# Patient Record
Sex: FEMALE | Race: WHITE | Employment: OTHER | URBAN - METROPOLITAN AREA
[De-identification: names, ages, dates, MRNs, and addresses within clinical notes are randomized per-mention and may not be internally consistent; named-entity substitution may affect disease eponyms.]

---

## 2021-05-31 ENCOUNTER — RECORDS - HEALTHEAST (OUTPATIENT)
Dept: ADMINISTRATIVE | Facility: CLINIC | Age: 66
End: 2021-05-31

## 2024-09-10 ENCOUNTER — DOCUMENTATION ONLY (OUTPATIENT)
Dept: GERIATRICS | Facility: CLINIC | Age: 69
End: 2024-09-10
Payer: COMMERCIAL

## 2024-09-12 ENCOUNTER — DOCUMENTATION ONLY (OUTPATIENT)
Dept: GERIATRICS | Facility: CLINIC | Age: 69
End: 2024-09-12

## 2024-09-12 ENCOUNTER — TRANSITIONAL CARE UNIT VISIT (OUTPATIENT)
Dept: GERIATRICS | Facility: CLINIC | Age: 69
End: 2024-09-12
Payer: COMMERCIAL

## 2024-09-12 VITALS
HEIGHT: 63 IN | TEMPERATURE: 98.2 F | HEART RATE: 68 BPM | BODY MASS INDEX: 18.82 KG/M2 | WEIGHT: 106.2 LBS | SYSTOLIC BLOOD PRESSURE: 123 MMHG | DIASTOLIC BLOOD PRESSURE: 70 MMHG

## 2024-09-12 DIAGNOSIS — R41.89 COGNITIVE IMPAIRMENT: ICD-10-CM

## 2024-09-12 DIAGNOSIS — E11.9 CONTROLLED TYPE 2 DIABETES MELLITUS WITHOUT COMPLICATION, WITHOUT LONG-TERM CURRENT USE OF INSULIN (H): ICD-10-CM

## 2024-09-12 DIAGNOSIS — R52 PAIN: Primary | ICD-10-CM

## 2024-09-12 DIAGNOSIS — I50.9 CHRONIC CONGESTIVE HEART FAILURE, UNSPECIFIED HEART FAILURE TYPE (H): ICD-10-CM
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
## 2024-09-12 PROBLEM — I34.1 MYXOMATOUS MITRAL VALVE: Status: ACTIVE | Noted: 2024-06-24

## 2024-09-12 PROBLEM — E87.5 HYPERKALEMIA: Status: ACTIVE | Noted: 2024-07-28

## 2024-09-12 PROBLEM — F15.10 METHAMPHETAMINE USE (H): Status: ACTIVE | Noted: 2024-07-24

## 2024-09-12 PROBLEM — E53.8 FOLIC ACID DEFICIENCY: Status: ACTIVE | Noted: 2019-07-24

## 2024-09-12 PROBLEM — J44.9 COPD (CHRONIC OBSTRUCTIVE PULMONARY DISEASE) (H): Status: ACTIVE | Noted: 2022-12-28

## 2024-09-12 PROBLEM — J43.9 PULMONARY EMPHYSEMA, UNSPECIFIED EMPHYSEMA TYPE (H): Status: ACTIVE | Noted: 2022-05-10

## 2024-09-12 PROBLEM — D64.9 ACUTE ON CHRONIC ANEMIA: Status: ACTIVE | Noted: 2024-08-07

## 2024-09-12 PROBLEM — F15.11 HISTORY OF METHAMPHETAMINE ABUSE (H): Status: ACTIVE | Noted: 2024-06-16

## 2024-09-12 PROBLEM — M85.80 OSTEOPENIA: Status: ACTIVE | Noted: 2018-11-08

## 2024-09-12 PROBLEM — D50.9 MICROCYTIC ANEMIA: Status: ACTIVE | Noted: 2024-06-15

## 2024-09-12 PROBLEM — F33.9 DEPRESSION, RECURRENT (H): Status: ACTIVE | Noted: 2024-06-16

## 2024-09-12 PROBLEM — I16.1 HYPERTENSIVE EMERGENCY: Status: ACTIVE | Noted: 2024-06-15

## 2024-09-12 PROBLEM — E11.22 TYPE 2 DIABETES MELLITUS WITH CHRONIC KIDNEY DISEASE (H): Status: ACTIVE | Noted: 2024-06-16

## 2024-09-12 PROBLEM — J96.21 ACUTE ON CHRONIC HYPOXIC RESPIRATORY FAILURE (H): Status: ACTIVE | Noted: 2024-08-17

## 2024-09-12 PROBLEM — D72.829 LEUKOCYTOSIS: Status: ACTIVE | Noted: 2018-11-09

## 2024-09-12 PROBLEM — E87.1 HYPONATREMIA: Status: ACTIVE | Noted: 2023-12-14

## 2024-09-12 PROBLEM — D62 ACUTE BLOOD LOSS ANEMIA: Status: ACTIVE | Noted: 2022-05-04

## 2024-09-12 PROBLEM — I34.0 SEVERE MITRAL VALVE REGURGITATION: Status: ACTIVE | Noted: 2023-08-27

## 2024-09-12 PROBLEM — R93.89 ABNORMAL CHEST X-RAY: Status: ACTIVE | Noted: 2024-08-19

## 2024-09-12 PROBLEM — J96.02 ACUTE RESPIRATORY FAILURE WITH HYPOXIA AND HYPERCAPNIA (H): Status: ACTIVE | Noted: 2024-07-28

## 2024-09-12 PROBLEM — I50.23 ACUTE ON CHRONIC HFREF (HEART FAILURE WITH REDUCED EJECTION FRACTION) (H): Status: ACTIVE | Noted: 2023-08-27

## 2024-09-12 PROBLEM — I50.33 ACUTE ON CHRONIC HEART FAILURE WITH PRESERVED EJECTION FRACTION (HFPEF) (H): Status: ACTIVE | Noted: 2024-08-17
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
## 2024-09-12 PROBLEM — K75.81 NASH (NONALCOHOLIC STEATOHEPATITIS): Status: ACTIVE | Noted: 2018-03-12

## 2024-09-12 PROBLEM — J44.1 COPD WITH EXACERBATION (H): Status: ACTIVE | Noted: 2024-08-17

## 2024-09-12 PROBLEM — J96.01 ACUTE RESPIRATORY FAILURE WITH HYPOXIA AND HYPERCAPNIA (H): Status: ACTIVE | Noted: 2024-07-28

## 2024-09-12 PROBLEM — I50.21 ACUTE SYSTOLIC (CONGESTIVE) HEART FAILURE (H): Status: ACTIVE | Noted: 2022-12-29

## 2024-09-12 PROBLEM — R07.9 CHEST PAIN: Status: ACTIVE | Noted: 2024-06-15

## 2024-09-12 PROBLEM — N18.30 STAGE 3 CHRONIC KIDNEY DISEASE, UNSPECIFIED WHETHER STAGE 3A OR 3B CKD (H): Status: ACTIVE | Noted: 2024-06-16

## 2024-09-12 PROBLEM — I10 HTN (HYPERTENSION): Status: ACTIVE | Noted: 2020-10-28

## 2024-09-12 PROBLEM — I50.33 ACUTE ON CHRONIC DIASTOLIC (CONGESTIVE) HEART FAILURE (H): Status: ACTIVE | Noted: 2024-08-07

## 2024-09-12 PROCEDURE — 99309 SBSQ NF CARE MODERATE MDM 30: CPT | Performed by: NURSE PRACTITIONER

## 2024-09-12 RX ORDER — ATORVASTATIN CALCIUM 20 MG/1
20 TABLET, FILM COATED ORAL DAILY
COMMUNITY

## 2024-09-12 RX ORDER — FUROSEMIDE 40 MG
40 TABLET ORAL DAILY
COMMUNITY

## 2024-09-12 RX ORDER — OXYCODONE HYDROCHLORIDE 5 MG/1
5 TABLET ORAL EVERY 6 HOURS PRN
COMMUNITY
Start: 2024-09-12 | End: 2024-09-12

## 2024-09-12 RX ORDER — OXYCODONE HYDROCHLORIDE 5 MG/1
5 TABLET ORAL EVERY 6 HOURS PRN
COMMUNITY

## 2024-09-12 RX ORDER — OXYCODONE HYDROCHLORIDE 5 MG/1
5 TABLET ORAL EVERY 6 HOURS PRN
Qty: 60 TABLET | Refills: 0 | Status: SHIPPED | OUTPATIENT
Start: 2024-09-12

## 2024-09-12 RX ORDER — ALBUTEROL SULFATE 0.83 MG/ML
2.5 SOLUTION RESPIRATORY (INHALATION) EVERY 4 HOURS PRN
COMMUNITY

## 2024-09-12 RX ORDER — MICONAZOLE NITRATE 20 MG/G
CREAM TOPICAL 2 TIMES DAILY
COMMUNITY

## 2024-09-12 RX ORDER — FLUOXETINE 40 MG/1
40 CAPSULE ORAL DAILY
COMMUNITY

## 2024-09-12 RX ORDER — MIRTAZAPINE 7.5 MG/1
7.5 TABLET, FILM COATED ORAL AT BEDTIME
COMMUNITY

## 2024-09-12 RX ORDER — ISOSORBIDE MONONITRATE 60 MG/1
60 TABLET, EXTENDED RELEASE ORAL DAILY
COMMUNITY

## 2024-09-12 RX ORDER — CARVEDILOL 25 MG/1
25 TABLET ORAL 2 TIMES DAILY WITH MEALS
COMMUNITY

## 2024-09-12 NOTE — PROGRESS NOTES
[facility] GERIATRICS    PRIMARY CARE PROVIDER AND CLINIC:  No primary care provider on file., No primary physician on file.  Chief Complaint   Patient presents with    Hospital F/U      [place] Medical Record Number:  [id number]  Place of Service where encounter took place:  [facility] ([facility]) [[id number]]    Sloane Monroe  is a 69 year old  (1955), admitted to the above facility from  [facility]. Hospital stay 9/7/2024 through 9/11/2024..   HPI:    Brisa has a history of diabetes, CHF, hypertension, COPD and cognitive impairment.  She lives independently and was admitted to the bathroom at night when she fell and landed on her bottom.  Imaging negative for acute process in the hospital.  She was also placed on steroids for diffuse body pain concern for rheumatoid arthritis flare.  She did have an NINO and her diuretics were held however resumed at discharge.    She is seen sitting up in her bed.  She is alert, able to give me some background on her hospital stay but not detailed.  She denies acute pain today.    CODE STATUS/ADVANCE DIRECTIVES DISCUSSION:  Full Code  CPR/Full code   ALLERGIES:   Allergies   Allergen Reactions    Bupropion Hives      PAST MEDICAL HISTORY: No past medical history on file.   PAST SURGICAL HISTORY:   has no past surgical history on file.  FAMILY HISTORY: family history is not on file.  SOCIAL HISTORY:     Patient's living condition: lives alone    Post Discharge Medication Reconciliation Status:   MED REC REQUIRED  Post Medication Reconciliation Status:  Discharge medications reconciled and changed, see notes/orders       Current Outpatient Medications   Medication Sig Dispense Refill    albuterol (PROAIR RESPICLICK) 108 (90 Base) MCG/ACT inhaler Inhale 2 puffs into the lungs every 4 hours as needed for shortness of breath, wheezing or cough.      albuterol (PROVENTIL) (2.5 MG/3ML) 0.083% neb solution Take 2.5 mg by nebulization every 4 hours as needed for  "shortness of breath, wheezing or cough.      atorvastatin (LIPITOR) 20 MG tablet Take 20 mg by mouth daily.      carvedilol (COREG) 25 MG tablet Take 25 mg by mouth 2 times daily (with meals).      empagliflozin (JARDIANCE) 10 MG TABS tablet Take 10 mg by mouth daily.      FLUoxetine (PROZAC) 40 MG capsule Take 40 mg by mouth daily.      furosemide (LASIX) 40 MG tablet Take 40 mg by mouth daily.      insulin aspart (NOVOLOG PEN) 100 UNIT/ML pen Inject subcutaneously 3 times daily (with meals). insulin pen; 100 unit/mL (3 mL); amt: Per Sliding Scale;  If Blood Sugar is 150 to 199, give 1 Units.  If Blood Sugar is 200 to 249, give 2 Units.  If Blood Sugar is 250 to 299, give 3 Units.  If Blood Sugar is 300 to 349, give 4 Units.  If Blood Sugar is 350 to 399, give 5 Units.  If Blood Sugar is greater than 399, give 6 Units.  If Blood Sugar is greater than 399, call MD.  subcutaneous      isosorbide mononitrate (IMDUR) 60 MG 24 hr tablet Take 60 mg by mouth daily.      metFORMIN (GLUCOPHAGE) 500 MG tablet Take 500 mg by mouth 2 times daily (with meals).      miconazole (MICATIN) 2 % external cream Apply topically 2 times daily.      mirtazapine (REMERON) 7.5 MG tablet Take 7.5 mg by mouth at bedtime.      oxyCODONE (ROXICODONE) 5 MG tablet Take 5 mg by mouth every 6 hours as needed for severe pain.      oxyCODONE (ROXICODONE) 5 MG tablet Take 1 tablet (5 mg) by mouth every 6 hours as needed for severe pain. 60 tablet 0     No current facility-administered medications for this visit.       ROS:  4 point ROS including Respiratory, CV, GI and , other than that noted in the HPI,  is negative    Vitals:  /70   Pulse 68   Temp 98.2  F (36.8  C)   Ht 1.6 m (5' 3\")   Wt 48.2 kg (106 lb 3.2 oz)   BMI 18.81 kg/m    Exam:  General appearance: alert, cooperative, thin.   Lungs: respirations unlabored,no wheezing or rales.   Cardiovascular: Regular rate and rhythm.   ABDOMEN: Globular and soft, non tender.  "   Extremities: extremities normal, atraumatic, no cyanosis or edema  Skin: No rashes or lesions  Neurologic: oriented. No focal deficits.   Psych: interacts well with caregivers,  pleasant    Lab/Diagnostic data:  Recent labs in [facility] reviewed by me today.     ASSESSMENT/PLAN:    1. Fall, sequela    2. Cognitive impairment    3. Controlled type 2 diabetes mellitus without complication, without long-term current use of insulin (H)    4. Chronic congestive heart failure, unspecified heart failure type (H)      Fall: Thought to be mechanical.  PT and OT in the nursing home for deconditioning.  Has been ambulating around the facility without a walker despite reminders.  Cognitive impairment: Currently lives independently.'s OT to repeat slums.    Type II diabetes: Continues on Jardiance, metformin.  Chronic CHF: Continue carvedilol, daily weights, furosemide 40 mg daily.  Repeat BMP next week.        Electronically signed by:  Eddie Hammond CNP

## 2024-09-12 NOTE — LETTER
9/12/2024      Sloane Monroe  [address]  [address]  [place] [facility] [address]        [facility] GERIATRICS    PRIMARY CARE PROVIDER AND CLINIC:  No primary care provider on file., No primary physician on file.  Chief Complaint   Patient presents with     Hospital F/U      [place] Medical Record Number:  [id number]  Place of Service where encounter took place:  [facility] ([facility]) [[id number]]    Sloane Monroe  is a 69 year old  (1955), admitted to the above facility from  [facility]. Hospital stay 9/7/2024 through 9/11/2024..   HPI:    Brisa has a history of diabetes, CHF, hypertension, COPD and cognitive impairment.  She lives independently and was admitted to the bathroom at night when she fell and landed on her bottom.  Imaging negative for acute process in the hospital.  She was also placed on steroids for diffuse body pain concern for rheumatoid arthritis flare.  She did have an NINO and her diuretics were held however resumed at discharge.    She is seen sitting up in her bed.  She is alert, able to give me some background on her hospital stay but not detailed.  She denies acute pain today.    CODE STATUS/ADVANCE DIRECTIVES DISCUSSION:  Full Code  CPR/Full code   ALLERGIES:   Allergies   Allergen Reactions     Bupropion Hives      PAST MEDICAL HISTORY: No past medical history on file.   PAST SURGICAL HISTORY:   has no past surgical history on file.  FAMILY HISTORY: family history is not on file.  SOCIAL HISTORY:     Patient's living condition: lives alone    Post Discharge Medication Reconciliation Status:   MED REC REQUIRED  Post Medication Reconciliation Status:  Discharge medications reconciled and changed, see notes/orders       Current Outpatient Medications   Medication Sig Dispense Refill     albuterol (PROAIR RESPICLICK) 108 (90 Base) MCG/ACT inhaler Inhale 2 puffs into the lungs every 4 hours as needed for shortness of breath, wheezing or cough.       albuterol (PROVENTIL)  "(2.5 MG/3ML) 0.083% neb solution Take 2.5 mg by nebulization every 4 hours as needed for shortness of breath, wheezing or cough.       atorvastatin (LIPITOR) 20 MG tablet Take 20 mg by mouth daily.       carvedilol (COREG) 25 MG tablet Take 25 mg by mouth 2 times daily (with meals).       empagliflozin (JARDIANCE) 10 MG TABS tablet Take 10 mg by mouth daily.       FLUoxetine (PROZAC) 40 MG capsule Take 40 mg by mouth daily.       furosemide (LASIX) 40 MG tablet Take 40 mg by mouth daily.       insulin aspart (NOVOLOG PEN) 100 UNIT/ML pen Inject subcutaneously 3 times daily (with meals). insulin pen; 100 unit/mL (3 mL); amt: Per Sliding Scale;  If Blood Sugar is 150 to 199, give 1 Units.  If Blood Sugar is 200 to 249, give 2 Units.  If Blood Sugar is 250 to 299, give 3 Units.  If Blood Sugar is 300 to 349, give 4 Units.  If Blood Sugar is 350 to 399, give 5 Units.  If Blood Sugar is greater than 399, give 6 Units.  If Blood Sugar is greater than 399, call MD.  subcutaneous       isosorbide mononitrate (IMDUR) 60 MG 24 hr tablet Take 60 mg by mouth daily.       metFORMIN (GLUCOPHAGE) 500 MG tablet Take 500 mg by mouth 2 times daily (with meals).       miconazole (MICATIN) 2 % external cream Apply topically 2 times daily.       mirtazapine (REMERON) 7.5 MG tablet Take 7.5 mg by mouth at bedtime.       oxyCODONE (ROXICODONE) 5 MG tablet Take 5 mg by mouth every 6 hours as needed for severe pain.       oxyCODONE (ROXICODONE) 5 MG tablet Take 1 tablet (5 mg) by mouth every 6 hours as needed for severe pain. 60 tablet 0     No current facility-administered medications for this visit.       ROS:  4 point ROS including Respiratory, CV, GI and , other than that noted in the HPI,  is negative    Vitals:  /70   Pulse 68   Temp 98.2  F (36.8  C)   Ht 1.6 m (5' 3\")   Wt 48.2 kg (106 lb 3.2 oz)   BMI 18.81 kg/m    Exam:  General appearance: alert, cooperative, thin.   Lungs: respirations unlabored,no wheezing or " rales.   Cardiovascular: Regular rate and rhythm.   ABDOMEN: Globular and soft, non tender.    Extremities: extremities normal, atraumatic, no cyanosis or edema  Skin: No rashes or lesions  Neurologic: oriented. No focal deficits.   Psych: interacts well with caregivers,  pleasant    Lab/Diagnostic data:  Recent labs in [facility] reviewed by me today.     ASSESSMENT/PLAN:    1. Fall, sequela    2. Cognitive impairment    3. Controlled type 2 diabetes mellitus without complication, without long-term current use of insulin (H)    4. Chronic congestive heart failure, unspecified heart failure type (H)      Fall: Thought to be mechanical.  PT and OT in the nursing home for deconditioning.  Has been ambulating around the facility without a walker despite reminders.  Cognitive impairment: Currently lives independently.'s OT to repeat slums.    Type II diabetes: Continues on Jardiance, metformin.  Chronic CHF: Continue carvedilol, daily weights, furosemide 40 mg daily.  Repeat BMP next week.        Electronically signed by:  Eddie Hammond CNP                   Sincerely,        Eddie Hammond CNP

## 2024-09-19 ENCOUNTER — LAB REQUISITION (OUTPATIENT)
Dept: LAB | Facility: CLINIC | Age: 69
End: 2024-09-19
Payer: COMMERCIAL

## 2024-09-19 ENCOUNTER — TRANSITIONAL CARE UNIT VISIT (OUTPATIENT)
Dept: GERIATRICS | Facility: CLINIC | Age: 69
End: 2024-09-19
Payer: COMMERCIAL

## 2024-09-19 VITALS
RESPIRATION RATE: 18 BRPM | OXYGEN SATURATION: 96 % | TEMPERATURE: 98.1 F | DIASTOLIC BLOOD PRESSURE: 89 MMHG | BODY MASS INDEX: 18.14 KG/M2 | HEART RATE: 73 BPM | WEIGHT: 102.4 LBS | SYSTOLIC BLOOD PRESSURE: 180 MMHG

## 2024-09-19 DIAGNOSIS — R53.81 MALAISE: Primary | ICD-10-CM

## 2024-09-19 DIAGNOSIS — R19.5 LOOSE STOOLS: ICD-10-CM

## 2024-09-19 DIAGNOSIS — R19.7 DIARRHEA, UNSPECIFIED: ICD-10-CM

## 2024-09-19 DIAGNOSIS — I10 HYPERTENSION, UNSPECIFIED TYPE: ICD-10-CM

## 2024-09-19 DIAGNOSIS — I50.9 CHRONIC CONGESTIVE HEART FAILURE, UNSPECIFIED HEART FAILURE TYPE (H): ICD-10-CM

## 2024-09-19 DIAGNOSIS — E11.9 CONTROLLED TYPE 2 DIABETES MELLITUS WITHOUT COMPLICATION, WITHOUT LONG-TERM CURRENT USE OF INSULIN (H): ICD-10-CM

## 2024-09-19 DIAGNOSIS — R41.89 COGNITIVE IMPAIRMENT: ICD-10-CM

## 2024-09-19 LAB — C DIFF TOX B STL QL: NEGATIVE

## 2024-09-19 PROCEDURE — 87493 C DIFF AMPLIFIED PROBE: CPT | Mod: ORL | Performed by: FAMILY MEDICINE

## 2024-09-19 PROCEDURE — 99309 SBSQ NF CARE MODERATE MDM 30: CPT | Performed by: NURSE PRACTITIONER

## 2024-09-19 NOTE — LETTER
9/19/2024      Sloane Monroe  [address]  [address]  [place] [facility] [address]        [facility] GERIATRICS    PRIMARY CARE PROVIDER AND CLINIC:  No primary care provider on file., No primary physician on file.  Chief Complaint   Patient presents with     TOLUECK      [place] Medical Record Number:  [id number]  Place of Service where encounter took place:  [facility] ([facility]) [[id number]]    Sloane Monroe  is a 69 year old  (1955), admitted to the above facility from  [facility]. Hospital stay 9/7/2024 through 9/11/2024..   HPI:    Brisa has a history of diabetes, CHF, hypertension, COPD and cognitive impairment.  She lives independently and was admitted to the bathroom at night when she fell and landed on her bottom.  Imaging negative for acute process in the hospital.  She was also placed on steroids for diffuse body pain concern for rheumatoid arthritis flare.  She did have an NINO and her diuretics were held however resumed at discharge.    Today: Brisa seen today for complaints of feeling unwell.  Reviewed labs and vital signs.  She was having loose stools about 2-3 times per day.  She complains of general malaise and stomach upset.  Reviewed vital signs and blood sugars, blood sugars have been running slightly elevated, blood pressure is also running greater than 150/90.  Will increase spironolactone.  Otherwise she is afebrile, and breathing comfortably on room air.  Denies vomiting, shortness of breath, sore throat, headache.    CODE STATUS/ADVANCE DIRECTIVES DISCUSSION:  Full Code  CPR/Full code   ALLERGIES:   Allergies   Allergen Reactions     Bupropion Hives      PAST MEDICAL HISTORY: No past medical history on file.   PAST SURGICAL HISTORY:   has no past surgical history on file.  FAMILY HISTORY: family history is not on file.  SOCIAL HISTORY:     Patient's living condition: lives alone    Post Discharge Medication Reconciliation Status:   MED REC REQUIRED  Post Medication  Reconciliation Status:          Current Outpatient Medications   Medication Sig Dispense Refill     albuterol (PROAIR RESPICLICK) 108 (90 Base) MCG/ACT inhaler Inhale 2 puffs into the lungs every 4 hours as needed for shortness of breath, wheezing or cough.       albuterol (PROVENTIL) (2.5 MG/3ML) 0.083% neb solution Take 2.5 mg by nebulization every 4 hours as needed for shortness of breath, wheezing or cough.       atorvastatin (LIPITOR) 20 MG tablet Take 20 mg by mouth daily.       carvedilol (COREG) 25 MG tablet Take 25 mg by mouth 2 times daily (with meals).       empagliflozin (JARDIANCE) 10 MG TABS tablet Take 10 mg by mouth daily.       FLUoxetine (PROZAC) 40 MG capsule Take 40 mg by mouth daily.       furosemide (LASIX) 40 MG tablet Take 40 mg by mouth daily.       insulin aspart (NOVOLOG PEN) 100 UNIT/ML pen Inject subcutaneously 3 times daily (with meals). insulin pen; 100 unit/mL (3 mL); amt: Per Sliding Scale;  If Blood Sugar is 150 to 199, give 1 Units.  If Blood Sugar is 200 to 249, give 2 Units.  If Blood Sugar is 250 to 299, give 3 Units.  If Blood Sugar is 300 to 349, give 4 Units.  If Blood Sugar is 350 to 399, give 5 Units.  If Blood Sugar is greater than 399, give 6 Units.  If Blood Sugar is greater than 399, call MD.  subcutaneous       isosorbide mononitrate (IMDUR) 60 MG 24 hr tablet Take 60 mg by mouth daily.       metFORMIN (GLUCOPHAGE) 500 MG tablet Take 500 mg by mouth 2 times daily (with meals).       miconazole (MICATIN) 2 % external cream Apply topically 2 times daily.       mirtazapine (REMERON) 7.5 MG tablet Take 7.5 mg by mouth at bedtime.       oxyCODONE (ROXICODONE) 5 MG tablet Take 1 tablet (5 mg) by mouth every 6 hours as needed for severe pain. 60 tablet 0     oxyCODONE (ROXICODONE) 5 MG tablet Take 5 mg by mouth every 6 hours as needed for severe pain.       No current facility-administered medications for this visit.       ROS:  4 point ROS including Respiratory, CV, GI and  , other than that noted in the HPI,  is negative    Vitals:  BP (!) 180/89   Pulse 73   Temp 98.1  F (36.7  C)   Resp 18   Wt 46.4 kg (102 lb 6.4 oz)   SpO2 96%   BMI 18.14 kg/m    Exam:  General appearance: alert, cooperative, thin.   Lungs: respirations unlabored,no wheezing or rales.   Cardiovascular: Regular rate and rhythm.   ABDOMEN: Globular and soft, non tender.    Extremities: extremities normal, atraumatic, no cyanosis or edema  Skin: No rashes or lesions  Neurologic: oriented. No focal deficits.   Psych: interacts well with caregivers,  pleasant    Lab/Diagnostic data:  Recent labs in [facility] reviewed by me today.     ASSESSMENT/PLAN:    1. Malaise    2. Cognitive impairment    3. Controlled type 2 diabetes mellitus without complication, without long-term current use of insulin (H)    4. Chronic congestive heart failure, unspecified heart failure type (H)    5. Hypertension, unspecified type    6. Loose stools        Malaise: General feeling unwell.  Check COVID swab, CBC tomorrow.  Cognitive impairment: Currently lives independently.'s OT to repeat slums.    Type II diabetes: Continues on Jardiance, metformin.  Consider increasing metformin however given new symptoms of nausea and stomach upset we will hold off on that.  Does need to be reevaluated for diabetic control.  Chronic CHF: Continue carvedilol, daily weights, furosemide 40 mg daily.  Check BMP tomorrow.  Hypertension: Blood pressures ranging generally greater than 150/90, increase spironolactone to 37.5 daily.  Loose stools: Hold all stool meds, check C. difficile, if negative may start Imodium 2 mg 4 times daily as needed.  Start barrier cream to SHELBI skin twice daily and as needed.        Electronically signed by:  Eddie Hammond CNP             Sincerely,        Eddie Hammond, CNP

## 2024-09-19 NOTE — PROGRESS NOTES
[facility] GERIATRICS    PRIMARY CARE PROVIDER AND CLINIC:  No primary care provider on file., No primary physician on file.  Chief Complaint   Patient presents with    [facility]      [place] Medical Record Number:  [id number]  Place of Service where encounter took place:  [facility] ([facility]) [[id number]]    Sloane Monroe  is a 69 year old  (1955), admitted to the above facility from  [facility]. Hospital stay 9/7/2024 through 9/11/2024..   HPI:    Brisa has a history of diabetes, CHF, hypertension, COPD and cognitive impairment.  She lives independently and was admitted to the bathroom at night when she fell and landed on her bottom.  Imaging negative for acute process in the hospital.  She was also placed on steroids for diffuse body pain concern for rheumatoid arthritis flare.  She did have an NINO and her diuretics were held however resumed at discharge.    Today: Brisa seen today for complaints of feeling unwell.  Reviewed labs and vital signs.  She was having loose stools about 2-3 times per day.  She complains of general malaise and stomach upset.  Reviewed vital signs and blood sugars, blood sugars have been running slightly elevated, blood pressure is also running greater than 150/90.  Will increase spironolactone.  Otherwise she is afebrile, and breathing comfortably on room air.  Denies vomiting, shortness of breath, sore throat, headache.    CODE STATUS/ADVANCE DIRECTIVES DISCUSSION:  Full Code  CPR/Full code   ALLERGIES:   Allergies   Allergen Reactions    Bupropion Hives      PAST MEDICAL HISTORY: No past medical history on file.   PAST SURGICAL HISTORY:   has no past surgical history on file.  FAMILY HISTORY: family history is not on file.  SOCIAL HISTORY:     Patient's living condition: lives alone    Post Discharge Medication Reconciliation Status:   MED REC REQUIRED  Post Medication Reconciliation Status:          Current Outpatient Medications   Medication Sig Dispense  Refill    albuterol (PROAIR RESPICLICK) 108 (90 Base) MCG/ACT inhaler Inhale 2 puffs into the lungs every 4 hours as needed for shortness of breath, wheezing or cough.      albuterol (PROVENTIL) (2.5 MG/3ML) 0.083% neb solution Take 2.5 mg by nebulization every 4 hours as needed for shortness of breath, wheezing or cough.      atorvastatin (LIPITOR) 20 MG tablet Take 20 mg by mouth daily.      carvedilol (COREG) 25 MG tablet Take 25 mg by mouth 2 times daily (with meals).      empagliflozin (JARDIANCE) 10 MG TABS tablet Take 10 mg by mouth daily.      FLUoxetine (PROZAC) 40 MG capsule Take 40 mg by mouth daily.      furosemide (LASIX) 40 MG tablet Take 40 mg by mouth daily.      insulin aspart (NOVOLOG PEN) 100 UNIT/ML pen Inject subcutaneously 3 times daily (with meals). insulin pen; 100 unit/mL (3 mL); amt: Per Sliding Scale;  If Blood Sugar is 150 to 199, give 1 Units.  If Blood Sugar is 200 to 249, give 2 Units.  If Blood Sugar is 250 to 299, give 3 Units.  If Blood Sugar is 300 to 349, give 4 Units.  If Blood Sugar is 350 to 399, give 5 Units.  If Blood Sugar is greater than 399, give 6 Units.  If Blood Sugar is greater than 399, call MD.  subcutaneous      isosorbide mononitrate (IMDUR) 60 MG 24 hr tablet Take 60 mg by mouth daily.      metFORMIN (GLUCOPHAGE) 500 MG tablet Take 500 mg by mouth 2 times daily (with meals).      miconazole (MICATIN) 2 % external cream Apply topically 2 times daily.      mirtazapine (REMERON) 7.5 MG tablet Take 7.5 mg by mouth at bedtime.      oxyCODONE (ROXICODONE) 5 MG tablet Take 1 tablet (5 mg) by mouth every 6 hours as needed for severe pain. 60 tablet 0    oxyCODONE (ROXICODONE) 5 MG tablet Take 5 mg by mouth every 6 hours as needed for severe pain.       No current facility-administered medications for this visit.       ROS:  4 point ROS including Respiratory, CV, GI and , other than that noted in the HPI,  is negative    Vitals:  BP (!) 180/89   Pulse 73   Temp 98.1  F  (36.7  C)   Resp 18   Wt 46.4 kg (102 lb 6.4 oz)   SpO2 96%   BMI 18.14 kg/m    Exam:  General appearance: alert, cooperative, thin.   Lungs: respirations unlabored,no wheezing or rales.   Cardiovascular: Regular rate and rhythm.   ABDOMEN: Globular and soft, non tender.    Extremities: extremities normal, atraumatic, no cyanosis or edema  Skin: No rashes or lesions  Neurologic: oriented. No focal deficits.   Psych: interacts well with caregivers,  pleasant    Lab/Diagnostic data:  Recent labs in [facility] reviewed by me today.     ASSESSMENT/PLAN:    1. Malaise    2. Cognitive impairment    3. Controlled type 2 diabetes mellitus without complication, without long-term current use of insulin (H)    4. Chronic congestive heart failure, unspecified heart failure type (H)    5. Hypertension, unspecified type    6. Loose stools        Malaise: General feeling unwell.  Check COVID swab, CBC tomorrow.  Cognitive impairment: Currently lives independently.'s OT to repeat slums.    Type II diabetes: Continues on Jardiance, metformin.  Consider increasing metformin however given new symptoms of nausea and stomach upset we will hold off on that.  Does need to be reevaluated for diabetic control.  Chronic CHF: Continue carvedilol, daily weights, furosemide 40 mg daily.  Check BMP tomorrow.  Hypertension: Blood pressures ranging generally greater than 150/90, increase spironolactone to 37.5 daily.  Loose stools: Hold all stool meds, check C. difficile, if negative may start Imodium 2 mg 4 times daily as needed.  Start barrier cream to SHELBI skin twice daily and as needed.        Electronically signed by:  Eddie Hammond CNP

## 2024-09-22 ENCOUNTER — LAB REQUISITION (OUTPATIENT)
Dept: LAB | Facility: CLINIC | Age: 69
End: 2024-09-22
Payer: COMMERCIAL

## 2024-09-22 DIAGNOSIS — I13.0 HYPERTENSIVE HEART AND CHRONIC KIDNEY DISEASE WITH HEART FAILURE AND STAGE 1 THROUGH STAGE 4 CHRONIC KIDNEY DISEASE, OR UNSPECIFIED CHRONIC KIDNEY DISEASE (H): ICD-10-CM

## 2024-09-25 LAB
ANION GAP SERPL CALCULATED.3IONS-SCNC: 12 MMOL/L (ref 7–15)
BASOPHILS # BLD AUTO: 0.1 10E3/UL (ref 0–0.2)
BASOPHILS NFR BLD AUTO: 1 %
BUN SERPL-MCNC: 30.7 MG/DL (ref 8–23)
CALCIUM SERPL-MCNC: 8.9 MG/DL (ref 8.8–10.4)
CHLORIDE SERPL-SCNC: 102 MMOL/L (ref 98–107)
CREAT SERPL-MCNC: 1.07 MG/DL (ref 0.51–0.95)
EGFRCR SERPLBLD CKD-EPI 2021: 57 ML/MIN/1.73M2
EOSINOPHIL # BLD AUTO: 0.2 10E3/UL (ref 0–0.7)
EOSINOPHIL NFR BLD AUTO: 2 %
ERYTHROCYTE [DISTWIDTH] IN BLOOD BY AUTOMATED COUNT: 18 % (ref 10–15)
GLUCOSE SERPL-MCNC: 189 MG/DL (ref 70–99)
HCO3 SERPL-SCNC: 25 MMOL/L (ref 22–29)
HCT VFR BLD AUTO: 33.7 % (ref 35–47)
HGB BLD-MCNC: 8.8 G/DL (ref 11.7–15.7)
IMM GRANULOCYTES # BLD: 0 10E3/UL
IMM GRANULOCYTES NFR BLD: 0 %
LYMPHOCYTES # BLD AUTO: 2.5 10E3/UL (ref 0.8–5.3)
LYMPHOCYTES NFR BLD AUTO: 28 %
MCH RBC QN AUTO: 16.9 PG (ref 26.5–33)
MCHC RBC AUTO-ENTMCNC: 26.1 G/DL (ref 31.5–36.5)
MCV RBC AUTO: 65 FL (ref 78–100)
MONOCYTES # BLD AUTO: 0.7 10E3/UL (ref 0–1.3)
MONOCYTES NFR BLD AUTO: 8 %
NEUTROPHILS # BLD AUTO: 5.6 10E3/UL (ref 1.6–8.3)
NEUTROPHILS NFR BLD AUTO: 62 %
NRBC # BLD AUTO: 0 10E3/UL
NRBC BLD AUTO-RTO: 0 /100
PLATELET # BLD AUTO: 529 10E3/UL (ref 150–450)
POTASSIUM SERPL-SCNC: 4.1 MMOL/L (ref 3.4–5.3)
RBC # BLD AUTO: 5.22 10E6/UL (ref 3.8–5.2)
SODIUM SERPL-SCNC: 139 MMOL/L (ref 135–145)
WBC # BLD AUTO: 9.1 10E3/UL (ref 4–11)

## 2024-09-25 PROCEDURE — P9604 ONE-WAY ALLOW PRORATED TRIP: HCPCS | Mod: ORL | Performed by: FAMILY MEDICINE

## 2024-09-25 PROCEDURE — 85025 COMPLETE CBC W/AUTO DIFF WBC: CPT | Mod: ORL | Performed by: FAMILY MEDICINE

## 2024-09-25 PROCEDURE — 80048 BASIC METABOLIC PNL TOTAL CA: CPT | Mod: ORL | Performed by: FAMILY MEDICINE

## 2024-09-25 PROCEDURE — 36415 COLL VENOUS BLD VENIPUNCTURE: CPT | Mod: ORL | Performed by: FAMILY MEDICINE

## 2024-09-30 ENCOUNTER — LAB REQUISITION (OUTPATIENT)
Dept: LAB | Facility: CLINIC | Age: 69
End: 2024-09-30
Payer: COMMERCIAL

## 2024-09-30 DIAGNOSIS — D64.9 ANEMIA, UNSPECIFIED: ICD-10-CM
